# Patient Record
Sex: FEMALE | Race: WHITE | URBAN - METROPOLITAN AREA
[De-identification: names, ages, dates, MRNs, and addresses within clinical notes are randomized per-mention and may not be internally consistent; named-entity substitution may affect disease eponyms.]

---

## 2020-07-28 ENCOUNTER — APPOINTMENT (RX ONLY)
Dept: URBAN - METROPOLITAN AREA CLINIC 374 | Facility: CLINIC | Age: 20
Setting detail: DERMATOLOGY
End: 2020-07-28

## 2020-07-28 DIAGNOSIS — I78.1 NEVUS, NON-NEOPLASTIC: ICD-10-CM

## 2020-07-28 DIAGNOSIS — L30.8 OTHER SPECIFIED DERMATITIS: ICD-10-CM

## 2020-07-28 DIAGNOSIS — D22 MELANOCYTIC NEVI: ICD-10-CM

## 2020-07-28 PROBLEM — D22.5 MELANOCYTIC NEVI OF TRUNK: Status: ACTIVE | Noted: 2020-07-28

## 2020-07-28 PROCEDURE — ? PRESCRIPTION MEDICATION MANAGEMENT

## 2020-07-28 PROCEDURE — ? COUNSELING

## 2020-07-28 PROCEDURE — ? ELECTRODESICCATION

## 2020-07-28 PROCEDURE — 17110 DESTRUCTION B9 LES UP TO 14: CPT

## 2020-07-28 PROCEDURE — 99213 OFFICE O/P EST LOW 20 MIN: CPT | Mod: 25

## 2020-07-28 PROCEDURE — ? PRESCRIPTION

## 2020-07-28 RX ORDER — CLOBETASOL PROPIONATE 0.5 MG/G
OINTMENT TOPICAL
Qty: 1 | Refills: 3 | Status: ERX | COMMUNITY
Start: 2020-07-28

## 2020-07-28 RX ADMIN — CLOBETASOL PROPIONATE: 0.5 OINTMENT TOPICAL at 00:00

## 2020-07-28 ASSESSMENT — LOCATION DETAILED DESCRIPTION DERM
LOCATION DETAILED: SUBXIPHOID
LOCATION DETAILED: RIGHT ANTERIOR DISTAL THIGH
LOCATION DETAILED: LEFT DISTAL PRETIBIAL REGION
LOCATION DETAILED: NASAL SUPRATIP
LOCATION DETAILED: EPIGASTRIC SKIN
LOCATION DETAILED: LEFT ANTERIOR DISTAL THIGH

## 2020-07-28 ASSESSMENT — LOCATION SIMPLE DESCRIPTION DERM
LOCATION SIMPLE: LEFT THIGH
LOCATION SIMPLE: LEFT PRETIBIAL REGION
LOCATION SIMPLE: NOSE
LOCATION SIMPLE: RIGHT THIGH
LOCATION SIMPLE: ABDOMEN

## 2020-07-28 ASSESSMENT — LOCATION ZONE DERM
LOCATION ZONE: LEG
LOCATION ZONE: TRUNK
LOCATION ZONE: NOSE

## 2020-07-28 NOTE — HPI: EVALUATION OF SKIN LESION(S)
What Type Of Note Output Would You Prefer (Optional)?: Standard Output
Hpi Title: Evaluation of a Skin Lesion
How Severe Are Your Spot(S)?: mild
Have Your Spot(S) Been Treated In The Past?: has been treated
Family Member: Grandfather
Hpi Title: Evaluation of Skin Lesions
Have Your Spot(S) Been Treated In The Past?: has not been treated

## 2020-07-28 NOTE — PROCEDURE: PRESCRIPTION MEDICATION MANAGEMENT
Detail Level: Zone
Render In Strict Bullet Format?: No
Initiate Treatment: clobetasol 0.05 % topical ointment; Apply to affected area bid

## 2020-07-28 NOTE — PROCEDURE: ELECTRODESICCATION
Include Z78.9 (Other Specified Conditions Influencing Health Status) As An Associated Diagnosis?: No
Medical Necessity Clause: This procedure was medically necessary because the lesions that were treated were:
Consent: The patient's consent was obtained including but not limited to risks of crusting, scabbing, blistering, scarring, darker or lighter pigmentary change, recurrence, incomplete removal and infection.
Detail Level: Simple
Anesthesia Type: 1% lidocaine with epinephrine
Post-Care Instructions: I reviewed with the patient in detail post-care instructions. Patient is to wear sunprotection, and avoid picking at any of the treated lesions. Pt may apply Vaseline to crusted or scabbing areas
Medical Necessity Information: It is in your best interest to select a reason for this procedure from the list below. All of these items fulfill various CMS LCD requirements except the new and changing color options.

## 2020-09-08 ENCOUNTER — APPOINTMENT (RX ONLY)
Dept: URBAN - METROPOLITAN AREA CLINIC 374 | Facility: CLINIC | Age: 20
Setting detail: DERMATOLOGY
End: 2020-09-08

## 2020-09-08 DIAGNOSIS — L30.8 OTHER SPECIFIED DERMATITIS: ICD-10-CM | Status: IMPROVED

## 2020-09-08 DIAGNOSIS — I78.1 NEVUS, NON-NEOPLASTIC: ICD-10-CM

## 2020-09-08 PROCEDURE — ? PRESCRIPTION

## 2020-09-08 PROCEDURE — ? ELECTRODESICCATION

## 2020-09-08 PROCEDURE — 99213 OFFICE O/P EST LOW 20 MIN: CPT | Mod: 25

## 2020-09-08 PROCEDURE — ? PRESCRIPTION MEDICATION MANAGEMENT

## 2020-09-08 PROCEDURE — 17110 DESTRUCTION B9 LES UP TO 14: CPT

## 2020-09-08 RX ORDER — TACROLIMUS 1 MG/G
OINTMENT TOPICAL QDAY
Qty: 1 | Refills: 2 | Status: ERX | COMMUNITY
Start: 2020-09-08

## 2020-09-08 RX ADMIN — TACROLIMUS: 1 OINTMENT TOPICAL at 00:00

## 2020-09-08 ASSESSMENT — LOCATION SIMPLE DESCRIPTION DERM
LOCATION SIMPLE: LEFT PRETIBIAL REGION
LOCATION SIMPLE: RIGHT THIGH
LOCATION SIMPLE: LEFT THIGH
LOCATION SIMPLE: NOSE

## 2020-09-08 ASSESSMENT — LOCATION DETAILED DESCRIPTION DERM
LOCATION DETAILED: NASAL SUPRATIP
LOCATION DETAILED: LEFT ANTERIOR DISTAL THIGH
LOCATION DETAILED: RIGHT ANTERIOR DISTAL THIGH
LOCATION DETAILED: LEFT DISTAL PRETIBIAL REGION

## 2020-09-08 ASSESSMENT — LOCATION ZONE DERM
LOCATION ZONE: NOSE
LOCATION ZONE: LEG

## 2020-09-08 NOTE — PROCEDURE: PRESCRIPTION MEDICATION MANAGEMENT
Modify Regimen: Alternate clobetasol 0.05 % topical ointment; Apply to affected area bid with tacrolimus
Detail Level: Zone
Render In Strict Bullet Format?: No
Initiate Treatment: Tacrolimus

## 2021-04-29 ENCOUNTER — APPOINTMENT (RX ONLY)
Dept: URBAN - METROPOLITAN AREA CLINIC 374 | Facility: CLINIC | Age: 21
Setting detail: DERMATOLOGY
End: 2021-04-29

## 2021-04-29 DIAGNOSIS — L81.4 OTHER MELANIN HYPERPIGMENTATION: ICD-10-CM

## 2021-04-29 DIAGNOSIS — Z71.89 OTHER SPECIFIED COUNSELING: ICD-10-CM

## 2021-04-29 DIAGNOSIS — D485 NEOPLASM OF UNCERTAIN BEHAVIOR OF SKIN: ICD-10-CM

## 2021-04-29 DIAGNOSIS — D22 MELANOCYTIC NEVI: ICD-10-CM

## 2021-04-29 PROBLEM — D48.5 NEOPLASM OF UNCERTAIN BEHAVIOR OF SKIN: Status: ACTIVE | Noted: 2021-04-29

## 2021-04-29 PROBLEM — D22.5 MELANOCYTIC NEVI OF TRUNK: Status: ACTIVE | Noted: 2021-04-29

## 2021-04-29 PROCEDURE — 99213 OFFICE O/P EST LOW 20 MIN: CPT

## 2021-04-29 PROCEDURE — ? SUNSCREEN RECOMMENDATIONS

## 2021-04-29 PROCEDURE — ? FULL BODY SKIN EXAM

## 2021-04-29 PROCEDURE — ? COUNSELING

## 2021-04-29 PROCEDURE — ? PHOTO-DOCUMENTATION

## 2021-04-29 PROCEDURE — ? DEFER

## 2021-04-29 ASSESSMENT — LOCATION SIMPLE DESCRIPTION DERM
LOCATION SIMPLE: RIGHT FOREARM
LOCATION SIMPLE: UPPER BACK
LOCATION SIMPLE: CHEST
LOCATION SIMPLE: GROIN
LOCATION SIMPLE: LEFT CHEEK
LOCATION SIMPLE: LEFT FOREARM

## 2021-04-29 ASSESSMENT — LOCATION DETAILED DESCRIPTION DERM
LOCATION DETAILED: MIDDLE STERNUM
LOCATION DETAILED: RIGHT PROXIMAL DORSAL FOREARM
LOCATION DETAILED: LEFT SUPRAPUBIC SKIN
LOCATION DETAILED: LEFT CENTRAL MALAR CHEEK
LOCATION DETAILED: LEFT PROXIMAL DORSAL FOREARM
LOCATION DETAILED: INFERIOR THORACIC SPINE

## 2021-04-29 ASSESSMENT — LOCATION ZONE DERM
LOCATION ZONE: FACE
LOCATION ZONE: TRUNK
LOCATION ZONE: ARM

## 2021-04-29 NOTE — PROCEDURE: DEFER
Other Procedure: 1.2cm
Procedure To Be Performed At Next Visit: Biopsy by shave method
Detail Level: Detailed
Introduction Text (Please End With A Colon): The following procedure was deferred

## 2021-05-04 ENCOUNTER — APPOINTMENT (RX ONLY)
Dept: URBAN - METROPOLITAN AREA CLINIC 374 | Facility: CLINIC | Age: 21
Setting detail: DERMATOLOGY
End: 2021-05-04

## 2021-05-04 DIAGNOSIS — D22 MELANOCYTIC NEVI: ICD-10-CM

## 2021-05-04 PROBLEM — D22.5 MELANOCYTIC NEVI OF TRUNK: Status: ACTIVE | Noted: 2021-05-04

## 2021-05-04 PROCEDURE — 11301 SHAVE SKIN LESION 0.6-1.0 CM: CPT

## 2021-05-04 PROCEDURE — ? SHAVE REMOVAL

## 2021-05-04 ASSESSMENT — LOCATION ZONE DERM: LOCATION ZONE: TRUNK

## 2021-05-04 ASSESSMENT — LOCATION DETAILED DESCRIPTION DERM: LOCATION DETAILED: LEFT SUPRAPUBIC SKIN

## 2021-05-04 ASSESSMENT — LOCATION SIMPLE DESCRIPTION DERM: LOCATION SIMPLE: GROIN

## 2023-05-25 ENCOUNTER — OFFICE VISIT (OUTPATIENT)
Dept: INTERNAL MEDICINE | Facility: CLINIC | Age: 23
End: 2023-05-25
Payer: COMMERCIAL

## 2023-05-25 VITALS
HEART RATE: 73 BPM | SYSTOLIC BLOOD PRESSURE: 116 MMHG | OXYGEN SATURATION: 99 % | HEIGHT: 67 IN | TEMPERATURE: 97.1 F | BODY MASS INDEX: 29.66 KG/M2 | DIASTOLIC BLOOD PRESSURE: 68 MMHG | WEIGHT: 189 LBS

## 2023-05-25 DIAGNOSIS — Z80.0 FAMILY HISTORY OF PANCREATIC CANCER: ICD-10-CM

## 2023-05-25 DIAGNOSIS — Z00.00 ENCOUNTER FOR GENERAL ADULT MEDICAL EXAMINATION WITHOUT ABNORMAL FINDINGS: Primary | ICD-10-CM

## 2023-05-25 DIAGNOSIS — Z13.220 LIPID SCREENING: ICD-10-CM

## 2023-05-25 DIAGNOSIS — Z80.0 FAMILY HISTORY OF COLON CANCER: ICD-10-CM

## 2023-05-25 DIAGNOSIS — Z11.59 ENCOUNTER FOR HEPATITIS C SCREENING TEST FOR LOW RISK PATIENT: ICD-10-CM

## 2023-05-25 DIAGNOSIS — Z11.3 ROUTINE SCREENING FOR STI (SEXUALLY TRANSMITTED INFECTION): ICD-10-CM

## 2023-05-25 DIAGNOSIS — F41.9 ANXIETY: ICD-10-CM

## 2023-05-25 PROCEDURE — 3008F BODY MASS INDEX DOCD: CPT | Performed by: STUDENT IN AN ORGANIZED HEALTH CARE EDUCATION/TRAINING PROGRAM

## 2023-05-25 PROCEDURE — 99385 PREV VISIT NEW AGE 18-39: CPT | Performed by: STUDENT IN AN ORGANIZED HEALTH CARE EDUCATION/TRAINING PROGRAM

## 2023-05-25 RX ORDER — VENLAFAXINE HYDROCHLORIDE 150 MG/1
150 CAPSULE, EXTENDED RELEASE ORAL DAILY
Qty: 90 CAPSULE | Refills: 1 | Status: SHIPPED | OUTPATIENT
Start: 2023-05-25 | End: 2023-11-20

## 2023-05-25 RX ORDER — VENLAFAXINE HYDROCHLORIDE 150 MG/1
CAPSULE, EXTENDED RELEASE ORAL DAILY
COMMUNITY
Start: 2023-05-12 | End: 2023-05-25 | Stop reason: SDUPTHER

## 2023-05-25 ASSESSMENT — PATIENT HEALTH QUESTIONNAIRE - PHQ9: SUM OF ALL RESPONSES TO PHQ9 QUESTIONS 1 & 2: 0

## 2023-05-25 NOTE — PROGRESS NOTES
NEW PATIENT    [facility] in [place]  Emili Dickens MD    Phone: [phone]  Fax: [phone]      HISTORY OF PRESENT ILLNESS        Establish Care       Patient is an 23 y.o. female who presents on 5/25/2023 as new patient to establish care.    History of Present Illness    Recent health issues:    COVID in January  Since then had wheezing and intermittent coughing fits  It has been progressively improving, now only occurring every few days  As a young kid had reactive airways, but that resolved by school age    Medical anxiety    Wondering about testing for autism  Doesn't have psychiatrist - effexor has kept     Diet: Better recently, a lot of sweets which she is trying to cut back on  Activity: trying to go for walks twice a week, wants to increase frequency  Dental: UTD  Vision: due  Cancer screenings  Pap at 19 or 20, normal    Other Providers caring for patient:   Patient Care Team     Relationship Specialty Notifications Start End   Emili Dickens MD PCP - General Family Medicine Admissions 5/25/23     Address:  [address] [place] [address]          Health Maintenance   Topic Date Due   • COVID-19 Vaccine (1) Never done   • HPV Vaccines (1 - 2-dose series) Never done   • Depression Screening  Never done   • HIV Screening  Never done   • Hepatitis C Screening  Never done   • Cervical Cancer Screening  Never done   • DTaP, Tdap, and Td Vaccines (7 - Td or Tdap) 09/03/2020   • Influenza Vaccine (Season Ended) 08/01/2023   • Zoster Vaccine (1 of 2) 01/29/2048   • Meningococcal ACWY  Completed   • HIB Vaccines  Completed   • Hepatitis B Vaccines  Completed   • IPV Vaccines  Completed   • Pneumococcal  Aged Out       Below was reviewed by me on the day of the visit.  PAST MEDICAL AND SURGICAL HISTORY        Past Medical History:   Diagnosis Date   • Anxiety    • Depression    • OCD (obsessive compulsive disorder)        Past Surgical History:  "  Procedure Laterality Date   • WISDOM TOOTH EXTRACTION       MEDICATIONS          Current Outpatient Medications:   •  venlafaxine XR (EFFEXOR-XR) 150 mg 24 hr capsule, Take 1 capsule (150 mg total) by mouth daily., Disp: 90 capsule, Rfl: 1  ALLERGIES        Patient has no known allergies.  FAMILY HISTORY        Family History   Problem Relation Age of Onset   • Hashimoto's thyroiditis Biological Mother    • Colon cancer Biological Mother 58   • Diabetes type II Biological Mother    • Diabetes type II Biological Father    • Nephrolithiasis Biological Father    • Gout Biological Father    • Aneurysm Maternal Grandmother    • Pancreatic cancer Maternal Grandfather    • Stroke Paternal Grandmother      SOCIAL/ TOBACCO HISTORY        Social History     Tobacco Use   • Smoking status: Never   • Smokeless tobacco: Never   Vaping Use   • Vaping status: Never Used   Substance Use Topics   • Alcohol use: Yes     Comment: social   • Drug use: Not Currently     REVIEW OF SYSTEMS        Review of Systems   See HPI  PHYSICAL EXAMINATION      Visit Vitals  /68 (BP Location: Right upper arm, Patient Position: Sitting)   Pulse 73   Temp 36.2 °C (97.1 °F)   Ht 1.702 m (5' 7\")   Wt 85.7 kg (189 lb)   SpO2 99%   BMI 29.60 kg/m²      Body mass index is 29.6 kg/m².  Wt Readings from Last 3 Encounters:   05/25/23 85.7 kg (189 lb)        No results found.      Physical Exam  Vitals reviewed.   Constitutional:       General: She is not in acute distress.     Appearance: Normal appearance. She is not ill-appearing, toxic-appearing or diaphoretic.   HENT:      Head: Normocephalic and atraumatic.      Right Ear: Tympanic membrane, ear canal and external ear normal. There is no impacted cerumen.      Left Ear: Tympanic membrane, ear canal and external ear normal. There is no impacted cerumen.      Nose: Nose normal.      Mouth/Throat:      Mouth: Mucous membranes are moist.      Pharynx: Oropharynx is clear.   Eyes:      General: No " scleral icterus.     Extraocular Movements: Extraocular movements intact.      Conjunctiva/sclera: Conjunctivae normal.      Pupils: Pupils are equal, round, and reactive to light.   Cardiovascular:      Rate and Rhythm: Normal rate and regular rhythm.      Pulses: Normal pulses.      Heart sounds: Normal heart sounds. No murmur heard.     No friction rub. No gallop.   Pulmonary:      Effort: Pulmonary effort is normal.      Breath sounds: Normal breath sounds. No wheezing or rales.   Abdominal:      General: Bowel sounds are normal.      Palpations: Abdomen is soft.      Tenderness: There is no guarding or rebound.   Musculoskeletal:         General: No deformity or signs of injury.      Cervical back: Normal range of motion and neck supple.   Skin:     General: Skin is warm and dry.      Capillary Refill: Capillary refill takes less than 2 seconds.   Neurological:      General: No focal deficit present.      Mental Status: She is alert and oriented to person, place, and time.   Psychiatric:         Mood and Affect: Mood normal.         Behavior: Behavior normal.         PRIOR LABS        No results found for: HGBA1C  No results found for: CHOL  No results found for: HDL  No results found for: LDLCALC  No results found for: TRIG  No results found for: CHOLHDL  No results found for: TSH  No results found for: WBC, HGB, HCT, MCV, PLT  No results found for: NA, K, CL, CO2, BUN, CREATININE, GLUCOSE, AST, ALT, PROTEIN, ALBUMIN, BILITOT, EGFR, ANIONGAP    ASSESSMENT AND PLAN   Diagnoses and all orders for this visit:    Encounter for general adult medical examination without abnormal findings (Primary)  Exam within normal limits  Recommend:   - high fiber diet that is rich in fruits, vegetables, whole grains, legumes, nuts and seeds, and low in processed foods, refined grains, added sugars, and trans-fats  - regular exercise with goal of >150 min moderate intensity exercise per week  - routine dental cleanings q6  mo  Cancer screenings:  Immunizations:    -     Hemoglobin A1c; Future  -     CBC and differential; Future  -     Basic metabolic panel; Future  -     TSH w reflex FT4; Future    Anxiety  Assessment & Plan:  Stable on effexor for years  Continue current dose    Concerned about possibility of autism - can pursue formal eval through psychology vs psychiatry.    Orders:  -     venlafaxine XR (EFFEXOR-XR) 150 mg 24 hr capsule; Take 1 capsule (150 mg total) by mouth daily.    Family history of colon cancer  Family history of pancreatic cancer  Strong family hx of cancer, pt reports more remote fam hx of leong sydrome, interested in genetic testing  Refer to cancer genetic counseling dept  -     Ambulatory Referral to Adult Genetics - [facility]; Future    Lipid screening  -     Lipid panel; Future    Routine screening for STI (sexually transmitted infection)  -     HIV 1,2 AB P24 AG; Future  -     RPR; Future  -     Chlamydia/Neisseria Gonorrhoeae RNA; Future    Encounter for hepatitis C screening test for low risk patient  -     Hepatitis C antibody; Future      Return in about 1 year (around 5/25/2024) for annual physical.    Emili Dickens MD    5/25/2023

## 2023-05-25 NOTE — PATIENT INSTRUCTIONS
Resources in the area where you can have formal autism evaluation:    [facility]: [phone]    [facility]: [phone]    Consider reaching out to [facility] to see if they do evaluations.

## 2023-06-02 LAB
BASOPHILS # BLD AUTO: 0.1 X10E3/UL (ref 0–0.2)
BASOPHILS NFR BLD AUTO: 1 %
EOSINOPHIL # BLD AUTO: 0.2 X10E3/UL (ref 0–0.4)
EOSINOPHIL NFR BLD AUTO: 3 %
ERYTHROCYTE [DISTWIDTH] IN BLOOD BY AUTOMATED COUNT: 14.7 % (ref 11.7–15.4)
HCT VFR BLD AUTO: 31.5 % (ref 34–46.6)
HGB BLD-MCNC: 9.6 G/DL (ref 11.1–15.9)
IMM GRANULOCYTES # BLD AUTO: 0 X10E3/UL (ref 0–0.1)
IMM GRANULOCYTES NFR BLD AUTO: 0 %
LYMPHOCYTES # BLD AUTO: 2 X10E3/UL (ref 0.7–3.1)
LYMPHOCYTES NFR BLD AUTO: 37 %
MCH RBC QN AUTO: 23.4 PG (ref 26.6–33)
MCHC RBC AUTO-ENTMCNC: 30.5 G/DL (ref 31.5–35.7)
MCV RBC AUTO: 77 FL (ref 79–97)
MONOCYTES # BLD AUTO: 0.4 X10E3/UL (ref 0.1–0.9)
MONOCYTES NFR BLD AUTO: 7 %
NEUTROPHILS # BLD AUTO: 3 X10E3/UL (ref 1.4–7)
NEUTROPHILS NFR BLD AUTO: 52 %
PLATELET # BLD AUTO: 382 X10E3/UL (ref 150–450)
RBC # BLD AUTO: 4.11 X10E6/UL (ref 3.77–5.28)
WBC # BLD AUTO: 5.6 X10E3/UL (ref 3.4–10.8)

## 2023-06-03 LAB
BUN SERPL-MCNC: 11 MG/DL (ref 6–20)
BUN/CREAT SERPL: 17 (ref 9–23)
CALCIUM SERPL-MCNC: 9.4 MG/DL (ref 8.7–10.2)
CHLORIDE SERPL-SCNC: 102 MMOL/L (ref 96–106)
CHOLEST SERPL-MCNC: 193 MG/DL (ref 100–199)
CO2 SERPL-SCNC: 25 MMOL/L (ref 20–29)
CREAT SERPL-MCNC: 0.66 MG/DL (ref 0.57–1)
EGFRCR SERPLBLD CKD-EPI 2021: 125 ML/MIN/1.73
GLUCOSE SERPL-MCNC: 80 MG/DL (ref 70–99)
HBA1C MFR BLD: 5.3 % (ref 4.8–5.6)
HCV IGG SERPL QL IA: NON REACTIVE
HDLC SERPL-MCNC: 45 MG/DL
HIV 1+2 AB+HIV1 P24 AG SERPL QL IA: NON REACTIVE
LDLC SERPL CALC-MCNC: 131 MG/DL (ref 0–99)
POTASSIUM SERPL-SCNC: 4.4 MMOL/L (ref 3.5–5.2)
RPR SER QL: NON REACTIVE
SODIUM SERPL-SCNC: 139 MMOL/L (ref 134–144)
T4 FREE SERPL-MCNC: 0.85 NG/DL (ref 0.82–1.77)
TRIGL SERPL-MCNC: 91 MG/DL (ref 0–149)
TSH SERPL DL<=0.005 MIU/L-ACNC: 1.56 UIU/ML (ref 0.45–4.5)
VLDLC SERPL CALC-MCNC: 17 MG/DL (ref 5–40)

## 2023-06-04 LAB
C TRACH RRNA SPEC QL NAA+PROBE: NEGATIVE
N GONORRHOEA RRNA SPEC QL NAA+PROBE: NEGATIVE

## 2023-06-12 DIAGNOSIS — D50.9 IRON DEFICIENCY ANEMIA, UNSPECIFIED IRON DEFICIENCY ANEMIA TYPE: Primary | ICD-10-CM

## 2023-06-14 ENCOUNTER — TELEPHONE (OUTPATIENT)
Dept: GENETICS | Facility: HOSPITAL | Age: 23
End: 2023-06-14
Payer: COMMERCIAL

## 2023-06-14 NOTE — TELEPHONE ENCOUNTER
I called Yessi Sharpe per referral from Dr. Dickens for a cancer genetic counseling appointment. I provided Yessi Sharpe with a brief overview of what to expect at an appointment and scheduling options. Yessi Sharpe was agreeable to scheduling an appointment via telemedicine. I transferred her to general scheduling.

## 2023-08-30 ENCOUNTER — TELEMEDICINE (OUTPATIENT)
Dept: GENETICS | Age: 23
End: 2023-08-30
Attending: STUDENT IN AN ORGANIZED HEALTH CARE EDUCATION/TRAINING PROGRAM
Payer: COMMERCIAL

## 2023-08-30 DIAGNOSIS — Z71.83 ENCOUNTER FOR NONPROCREATIVE GENETIC COUNSELING: Primary | ICD-10-CM

## 2023-08-30 DIAGNOSIS — Z80.0 FAMILY HISTORY OF COLON CANCER: ICD-10-CM

## 2023-08-30 DIAGNOSIS — Z80.8 FHX: MELANOMA: ICD-10-CM

## 2023-08-30 DIAGNOSIS — Z80.0 FAMILY HISTORY OF PANCREATIC CANCER: ICD-10-CM

## 2023-08-30 PROCEDURE — 96040 HC GENETICS COUNSELING SESSIONS-TELEHEALTH: CPT | Performed by: GENETIC COUNSELOR, MS

## 2023-08-30 NOTE — PROGRESS NOTES
Patient Name: Yessi Sharpe  Patient Legal Name: Yessi Sharpe  : 2000       Telemedicine Consent:    Prior to commencing the session, Yessi Sharpe provided verbal consent to have genetic counseling via telemedicine using [employer] Video Visit (Epic Video Client), which is a telemedicine platform being utilized.  Yessi understands the session will be billed to insurance or to them directly if uninsured or if not covered by insurance. Yessi was informed that the clinician is the only provider on?the video conference, sessions are not recorded by the clinician, and the patient is not permitted to record the session. The clinician confirmed identification of patient by name and birthdate, confirmed patient location, support person(s) present, and obtained a callback number in case disconnected.     Indication for Appointment:  Yessi Sharpe presented for genetic counseling and cancer risk assessment via a telemedicine encounter due to a family history of colon, melanoma, and pancreatic cancer. Yessi was referred by Emili Dickens MD and presented to the session alone.    Personal History:   Yessi is a 23 y.o. female of [place], [place]ian, and Ashkenazi [place] descent with primary visit diagnosis of Encounter for nonprocreative genetic counseling [Z71.83].    Past Medical History:   Diagnosis Date   • Anxiety    • Depression    • OCD (obsessive compulsive disorder)       Past Medical History Pertinent Negatives:   Denies History Of: Date Noted   • Disease of thyroid gland 2023   • Fibrocystic breast 2023   • Fibroid 2023   • Screening for breast cancer 2023     Past Surgical History:   Procedure Laterality Date   • [place] tooth extraction       Past Surgical History Pertinent Negatives:   Denies History Of: Date Noted   • Breast biopsy 2023   • Colonoscopy 2023   • Hysterectomy 2023   • Oophorectomy 2023      Height/Weight: (previously recorded in physician's  "office)  Height: 1.702 m (5' 7\")  Weight: 85.7 kg (189 lb)    Gynecologic History:  Menarche Age: 7 years  Age at first live birth: Nulliparous  Menopause Status: Pre-Menopause  Use of hormonal contraceptives: No  Use of fertility medications: No  Use of hormone replacement therapy: No  Use of Tamoxifen/Evista: No    Social History     Tobacco Use   • Smoking status: Never   • Smokeless tobacco: Never   Vaping Use   • Vaping Use: Never used   Substance Use Topics   • Alcohol use: Yes     Comment: social   • Drug use: Not Currently       Family History:  See completed family history in pedigree below. Of note, Yessi reports that her maternal grandmother's half-sisters may have tested positive for a mutation in one of the Amin Syndrome genes. She also reports that her mother's genetic testing was negative for Amin. The test reports are unavailable for review.    Genetic Education/Risk Assessment/Counseling:  Information was provided about the relationship between genes and cancer.  The concept of hereditary cancer was defined.  Natural history, risks and inheritance patterns of  cancer-associated genes were reviewed, as related to Yessi’s personal and/or family history.  Related psychosocial aspects were discussed.    Discussion of Genetic Testing:  The pros, cons, and limitations of testing for genetic susceptibility were discussed, including but not limited to test options, possible results, potential impact on management, and psychosocial aspects.  There may be limited data on the degree of cancer risk and/or no defined management guidelines associated with some genes.  If applicable, risk assessment models and/or published tables were used to provide a mutation probability estimate. Limitations of assessment were reviewed.    Given the reported personal and/or family history, genetic testing was offered and accepted. The following testing was ordered:    [facility]   Comprehensive Common Cancer Panel    Plan:  Yessi " was confirmed to have understood the aforementioned information and was assisted with decision making as needed.  Informational and supportive resources were provided. Consent was obtained to share chart note(s) with physicians. Yessi is encouraged to contact the program with personal/family history updates. Yessi will be contacted via telephone when genetic test results are available.     A total of 30 minutes was spent providing genetic counseling to Yessi.

## 2023-08-30 NOTE — LETTER
08/30/23    Emili Dickens MD  [address] [place] [address]    Re:  Patient Preferred Name: Yessi Sharpe  Patient Legal Name: Yessi Mcgovernn    Dear Dr. Dickens,    Thank you for referring your patient, Yessi Sharpe, to receive care through my office. I have enclosed a summary of the care provided to Yessi on 08/30/23.    Please contact me with any questions you may have regarding the visit.    Sincerely,             Farrah Adam, [facility]    [address] [place] [address]    CC: No Recipients

## 2023-08-30 NOTE — LETTER
08/30/23    Yessi Jaureguiwan  [address] [place] [address]    Dear MsLoren Dell,    Thank you for participating in the [facility].  It was a pleasure working with you. Attached is documentation from our discussion(s). It will also be sent to any physicians you indicated.      You may also choose to share this documentation with your family members. Because cancer risk is based on both personal and both maternal and paternal family history factors, as well as mutation status, your relatives are recommended to review their risks and genetic testing options (if applicable) with their own healthcare providers to derive a risk-appropriate, individualized plan.      If you have any questions, concerns, or updates to your personal/family history, please contact the [facility] Program at [phone] to further review your case.    Please see below for your encounter report.    Sincerely,         Farrah Adam, [facility]        Encounter Note    Telemedicine Consent:    Prior to commencing the session, Yessi NORRIS Dell provided verbal consent to have genetic counseling via telemedicine using [employer] Visit (Epic Video Client), which is a telemedicine platform being utilized.  Yessi understands the session will be billed to insurance or to them directly if uninsured or if not covered by insurance. Yessi was informed that the clinician is the only provider on?the video conference, sessions are not recorded by the clinician, and the patient is not permitted to record the session. The clinician confirmed identification of patient by name and birthdate, confirmed patient location, support person(s) present, and obtained a callback number in case disconnected.     Indication for Appointment:  Yessi Mcgovernn presented for genetic counseling and cancer risk assessment via a telemedicine encounter due to a family history of colon, melanoma, and  "pancreatic cancer. Yessi was referred by Emili Dickens MD and presented to the session alone.    Personal History:   Yessi is a 23 y.o. female of [place], [place], and Ashkenazi Congregational descent with primary visit diagnosis of Encounter for nonprocreative genetic counseling [Z71.83].    Past Medical History:   Diagnosis Date   • Anxiety    • Depression    • OCD (obsessive compulsive disorder)       Past Medical History Pertinent Negatives:   Denies History Of: Date Noted   • Disease of thyroid gland 08/30/2023   • Fibrocystic breast 08/30/2023   • Fibroid 08/30/2023   • Screening for breast cancer 08/30/2023     Past Surgical History:   Procedure Laterality Date   • [place] tooth extraction       Past Surgical History Pertinent Negatives:   Denies History Of: Date Noted   • Breast biopsy 08/30/2023   • Colonoscopy 08/30/2023   • Hysterectomy 08/30/2023   • Oophorectomy 08/30/2023      Height/Weight: (previously recorded in physician's office)  Height: 1.702 m (5' 7\")  Weight: 85.7 kg (189 lb)    Gynecologic History:  Menarche Age: 7 years  Age at first live birth: Nulliparous  Menopause Status: Pre-Menopause  Use of hormonal contraceptives: No  Use of fertility medications: No  Use of hormone replacement therapy: No  Use of Tamoxifen/Evista: No    Social History     Tobacco Use   • Smoking status: Never   • Smokeless tobacco: Never   Vaping Use   • Vaping Use: Never used   Substance Use Topics   • Alcohol use: Yes     Comment: social   • Drug use: Not Currently       Family History:  See completed family history in pedigree below. Of note, Yessi reports that her maternal grandmother's half-sisters may have tested positive for a mutation in one of the Amin Syndrome genes. She also reports that her mother's genetic testing was negative for Amin. The test reports are unavailable for review.    Genetic Education/Risk Assessment/Counseling:  Information was provided about the relationship between genes and cancer.  The " concept of hereditary cancer was defined.  Natural history, risks and inheritance patterns of  cancer-associated genes were reviewed, as related to Yessi’s personal and/or family history.  Related psychosocial aspects were discussed.    Discussion of Genetic Testing:  The pros, cons, and limitations of testing for genetic susceptibility were discussed, including but not limited to test options, possible results, potential impact on management, and psychosocial aspects.  There may be limited data on the degree of cancer risk and/or no defined management guidelines associated with some genes.  If applicable, risk assessment models and/or published tables were used to provide a mutation probability estimate. Limitations of assessment were reviewed.    Given the reported personal and/or family history, genetic testing was offered and accepted. The following testing was ordered:    [facility]   Comprehensive Common Cancer Panel    Plan:  Yessi was confirmed to have understood the aforementioned information and was assisted with decision making as needed.  Informational and supportive resources were provided. Consent was obtained to share chart note(s) with physicians. Yessi is encouraged to contact the program with personal/family history updates. Yessi will be contacted via telephone when genetic test results are available.     A total of 30 minutes was spent providing genetic counseling to Yessi.

## 2023-08-30 NOTE — LETTER
08/30/23    Yessi NORRIS Dell  [address] [place] [address]    Dear MsLoren Dell,    Thank you for participating in the [facility].  It was a pleasure working with you. Attached is documentation from our discussion(s). It will also be sent to any physicians you indicated.      You may also choose to share this documentation with your family members. Because cancer risk is based on both personal and both maternal and paternal family history factors, as well as mutation status, your relatives are recommended to review their risks and genetic testing options (if applicable) with their own healthcare providers to derive a risk-appropriate, individualized plan.      If you have any questions, concerns, or updates to your personal/family history, please contact the [facility] Risk Assessment and Genetics Program at [phone] to further review your case.    Please see below for your encounter report.    Sincerely,         Farrah Adam, [facility]        Encounter Note    Telemedicine Consent:    Prior to commencing the session, Yessi MYRNA Sharpe provided verbal consent to have genetic counseling via telemedicine using [employer] Visit (Epic Video Client), which is a telemedicine platform being utilized.  Yessi understands the session will be billed to insurance or to them directly if uninsured or if not covered by insurance. Yessi was informed that the clinician is the only provider on?the video conference, sessions are not recorded by the clinician, and the patient is not permitted to record the session. The clinician confirmed identification of patient by name and birthdate, confirmed patient location, support person(s) present, and obtained a callback number in case disconnected.     Indication for Appointment:  Yessi NORRIS Dell presented for genetic counseling and cancer risk assessment via a telemedicine encounter due to a {Referral Reason:[id number]}. Yessi was  "{Referred by:[id number]} and presented to the session alone.    Personal History:   Yessi is a 23 y.o. female of *** descent with primary visit diagnosis of Encounter for nonprocreative genetic counseling [Z71.83].    Past Medical History:   Diagnosis Date   • Anxiety    • Depression    • OCD (obsessive compulsive disorder)       Past Medical History Pertinent Negatives:   Denies History Of: Date Noted   • Disease of thyroid gland 08/30/2023   • Fibrocystic breast 08/30/2023   • Fibroid 08/30/2023   • Screening for breast cancer 08/30/2023     Past Surgical History:   Procedure Laterality Date   • [place] tooth extraction       Past Surgical History Pertinent Negatives:   Denies History Of: Date Noted   • Breast biopsy 08/30/2023   • Colonoscopy 08/30/2023   • Hysterectomy 08/30/2023   • Oophorectomy 08/30/2023       Head Circumference: *** centimeters     Height/Weight:  Height: 1.702 m (5' 7\")  Weight: 85.7 kg (189 lb)    ***delete gyn hx if male  Gynecologic History:  Menarche Age: 7 years  Age at first live birth: Nulliparous  Menopause Status: Pre-Menopause  Use of hormonal contraceptives: No  Use of fertility medications: No  Use of hormone replacement therapy: No  Use of Tamoxifen/Evista: No    Social History     Tobacco Use   • Smoking status: Never   • Smokeless tobacco: Never   Vaping Use   • Vaping Use: Never used   Substance Use Topics   • Alcohol use: Yes     Comment: social   • Drug use: Not Currently       Family History:  See completed family history in pedigree below.  *** insert  Genetic Education/Risk Assessment/Counseling:  Information was provided about the relationship between genes and cancer.  The concept of hereditary cancer was defined.  Natural history, risks and inheritance patterns of  cancer-associated genes were reviewed, as related to Yessi’s personal and/or family history.  Related psychosocial aspects were discussed.    Discussion of Genetic Testing:  The pros, cons, and limitations " of testing for genetic susceptibility were discussed, including but not limited to test options, possible results, potential impact on management, and psychosocial aspects.  There may be limited data on the degree of cancer risk and/or no defined management guidelines associated with some genes.  If applicable, risk assessment models and/or published tables were used to provide a mutation probability estimate. Limitations of assessment were reviewed.    Given the reported personal and/or family history, genetic testing {Testing Status:[id number]}

## 2023-09-22 ENCOUNTER — TELEPHONE (OUTPATIENT)
Dept: GENETICS | Age: 23
End: 2023-09-22
Payer: COMMERCIAL

## 2023-09-22 NOTE — LETTER
09/22/23    Yessi Sharpe  [address] [place] [address]    Dear Ms. Sharpe,    Thank you for participating in the [facility].  It was a pleasure working with you. Attached is documentation from our discussion(s). It will also be sent to any physicians you indicated.      You may also choose to share this documentation with your family members. Because cancer risk is based on both personal and both maternal and paternal family history factors, as well as mutation status, your relatives are recommended to review their risks and genetic testing options (if applicable) with their own healthcare providers to derive a risk-appropriate, individualized plan.      If you have any questions, concerns, or updates to your personal/family history, please contact the [facility] Program at [phone] to further review your case.    Please see below for your encounter report.    Sincerely,         Farrah Adam, [facility]        Encounter Note       Indication for Appointment:  Yessi Sharpe was seen by the Cancer Risk Assessment and Genetics Program via a telemedicine encounter due to a family history of colon, melanoma, and pancreatic cancer. Genetic testing was performed.    Yessi was contacted by telephone today to discuss genetic test results, risk-based management guidelines and any potential additional test options. Follow up appointments to discuss the results in more detail with our medical director may be scheduled by contacting the Cancer Risk Assessment and Genetics Program.    Genetic Test Results:    RESULT:    Negative- No Pathogenic Variants Identified    LAB/TEST:  [facility]   Comprehensive Common Cancer Panel        After receiving consent, the following result(s) were disclosed to Yessi:   - No reportable alterations were identified by sequencing and/or deletion/duplication analysis as interpreted by this laboratory.  This is  referred to as an indeterminate negative result.  A mutation could be present that cannot be detected by the current tests performed or in a gene not tested. Additionally, biological family members may have a mutation in any of the genes tested Yessi does not given the negative result.    Personal History:   Yessi is a 23 y.o. female of Indonesian, Swazi, and Ashkenazi Zoroastrian descent.    Past Medical History:   Diagnosis Date    Anxiety     Depression     OCD (obsessive compulsive disorder)      Past Medical History Pertinent Negatives:   Denies History Of: Date Noted    Disease of thyroid gland 08/30/2023    Fibrocystic breast 08/30/2023    Fibroid 08/30/2023    Screening for breast cancer 08/30/2023     Past Surgical History:   Procedure Laterality Date    [place] tooth extraction       Past Surgical History Pertinent Negatives:   Denies History Of: Date Noted    Breast biopsy 08/30/2023    Colonoscopy 08/30/2023    Hysterectomy 08/30/2023    Oophorectomy 08/30/2023     Gynecologic History:  Menarche Age: 7 years  Age at first live birth: Nulliparous  Menopause Status: Pre-Menopause  Use of hormonal contraceptives: No  Use of fertility medications: No  Use of hormone replacement therapy: No  Use of Tamoxifen/Evista: No    Social History     Tobacco Use    Smoking status: Never    Smokeless tobacco: Never   Vaping Use    Vaping Use: Never used   Substance Use Topics    Alcohol use: Yes     Comment: social    Drug use: Not Currently       Family History:  See completed family history in pedigree below.        Risk Assessment and Management:    As a clinically actionable mutation was not identified by the current test method(s), the cancer risks and guidelines reviewed are based on the personal and/or family history provided. As guidelines continually change and the efficacy of screening for some cancers remains under investigation, Yessi Sharpe is encouraged to review personal and family history with  managing physician(s) regularly.    Site of Surveillance Coordinating Provider Recommendation   Breast Primary Care or Gynecology · Lifetime risk of developing breast cancer was calculated using the Tyrer-Cuzick model and is estimated to be 15.1%.  · Breast cancer risk is dynamic and can increase with age and other factors.   · Breast cancer risk assessment should be reviewed at medical visits.  · A high lifetime risk for developing breast cancer is typically 20-25% or higher.  National Comprehensive Cancer Network (NCCN) guidelines for breast cancer screening include:   Breast awareness with prompt reporting of any noticed changes to healthcare provider    Annual clinical visit including clinical breast exam and ongoing risk assessment starting at age 25  Annual mammogram beginning by age 40     Gynecologic Gynecology · Pelvic exam and pap test annually or as directed by physician.      Gastrointestinal PCP  Gastroenterology · Guidelines for when to begin colorectal cancer screening in average risk individuals vary between age 45 and 50; thus, an appropriate screening plan based on personal factors, such as age, race, and symptoms, as well as family history, should be determined by screening gastroenterologist.   · Based on the reported family history of colorectal cancer, Mara lifetime risk of developing colorectal cancer is increased 2-3 fold over that of the general population.  General population risk is approximately 5-6%; thus, Yessis risk is 10-18%. This may be an underestimation of risk as the average colorectal cancer risk among individuals of Ashkenazi Faith descent has been reported to be increased. The patient was encouraged to review this history and risk with managing gastroenterologist to determine baseline age and frequency of colonoscopy.     Skin PCP  Dermatology · Yessi is encouraged to practice skin protective behaviors, such as:  · Annual full-body skin examination   · Use of sun  protective barriers such as sunscreens, clothing, hats and UV protective sunglasses with avoidance of mid-day sun, chronic sun exposure, and tanning beds is strongly recommended   · Awareness of ABCDEs of melanoma (asymmetry, border, color, diameter, evolution).       General Management PCP · Annual physical examination is encouraged.  · Adherence to a healthy lifestyle, including body mass index (BMI) <25 obtained through balanced diet and exercise  · Limit intake of alcoholic beverages to less than 1 drink per day (serving equals 1 ounce of liquor, 6 ounces of wine or 8 ounces of beer)  · Not smoking.       Plan:  Cancer risks are based on personal history, as well as on maternal and paternal family histories, mutation status, and other factors.  Relatives are encouraged to consider risk assessment and/or genetic evaluation to derive a risk-appropriate, individualized plan.  Should family member(s) be interested in genetic evaluation, the Cancer Risk Assessment and Genetics Program can provide consultation or help to find a genetic counselor in their area.    The information provided reflects current practice guidelines and may change with new medical discoveries/technology/updated personal or family history information.  Yessi was confirmed to have understood the aforementioned information and was assisted with decision making as needed.  Informational and supportive resources were provided.  Potential psychosocial ramifications related to test results were reviewed.  Consent was obtained to share chart note(s) with physicians.  Yessi plans to discuss the above information with physicians to determine an optimal risk management plan.    Yessi should contact the program with personal/family history updates as this could alter the guidelines provided and/or available test options and/or to inquire about new information specific to this case.   As stated, there may be other genes associated with cancer risk for  which Yessi was not tested.  Yessi was encouraged to contact the genetics program at [phone] with any questions or concerns and/or to periodically review test options and related insurance coverage.

## 2023-09-22 NOTE — TELEPHONE ENCOUNTER
Patient Name: Yessi Sharpe  Patient Legal Name: Yessi Sharpe  : 2000       Indication for Appointment:  Yessi Sharpe was seen by the Cancer Risk Assessment and Genetics Program via a telemedicine encounter due to a family history of colon, melanoma, and pancreatic cancer. Genetic testing was performed.    Yessi was contacted by telephone today to discuss genetic test results, risk-based management guidelines and any potential additional test options. Follow up appointments to discuss the results in more detail with our medical director may be scheduled by contacting the Cancer Risk Assessment and Genetics Program.    Genetic Test Results:    RESULT:    Negative- No Pathogenic Variants Identified    LAB/TEST:  [facility] ([facility])   Comprehensive Common Cancer Panel        After receiving consent, the following result(s) were disclosed to Yessi:   - No reportable alterations were identified by sequencing and/or deletion/duplication analysis as interpreted by this laboratory.  This is referred to as an indeterminate negative result.  A mutation could be present that cannot be detected by the current tests performed or in a gene not tested. Additionally, biological family members may have a mutation in any of the genes tested Yessi does not given the negative result.    Personal History:   Yessi is a 23 y.o. female of English, Azerbaijani, and Ashkenazi Catholic descent.    Past Medical History:   Diagnosis Date    Anxiety     Depression     OCD (obsessive compulsive disorder)      Past Medical History Pertinent Negatives:   Denies History Of: Date Noted    Disease of thyroid gland 2023    Fibrocystic breast 2023    Fibroid 2023    Screening for breast cancer 2023     Past Surgical History:   Procedure Laterality Date    [place] tooth extraction       Past Surgical History Pertinent Negatives:   Denies History Of: Date Noted    Breast biopsy 2023    Colonoscopy 2023    Hysterectomy  08/30/2023    Oophorectomy 08/30/2023     Gynecologic History:  Menarche Age: 7 years  Age at first live birth: Nulliparous  Menopause Status: Pre-Menopause  Use of hormonal contraceptives: No  Use of fertility medications: No  Use of hormone replacement therapy: No  Use of Tamoxifen/Evista: No    Social History     Tobacco Use    Smoking status: Never    Smokeless tobacco: Never   Vaping Use    Vaping Use: Never used   Substance Use Topics    Alcohol use: Yes     Comment: social    Drug use: Not Currently       Family History:  See completed family history in pedigree below.        Risk Assessment and Management:     As a clinically actionable mutation was not identified by the current test method(s), the cancer risks and guidelines reviewed are based on the personal and/or family history provided. As guidelines continually change and the efficacy of screening for some cancers remains under investigation, Yessi Sharpe is encouraged to review personal and family history with managing physician(s) regularly.    Site of Surveillance Coordinating Provider Recommendation   Breast Primary Care or Gynecology · Lifetime risk of developing breast cancer was calculated using the Tyrer-Cuzick model and is estimated to be 15.1%.  · Breast cancer risk is dynamic and can increase with age and other factors.   · Breast cancer risk assessment should be reviewed at medical visits.  · A high lifetime risk for developing breast cancer is typically 20-25% or higher.  National Comprehensive Cancer Network (NCCN) guidelines for breast cancer screening include:   Breast awareness with prompt reporting of any noticed changes to healthcare provider    Annual clinical visit including clinical breast exam and ongoing risk assessment starting at age 25  Annual mammogram beginning by age 40     Gynecologic Gynecology · Pelvic exam and pap test annually or as directed by physician.      Gastrointestinal PCP  Gastroenterology · Guidelines  for when to begin colorectal cancer screening in average risk individuals vary between age 45 and 50; thus, an appropriate screening plan based on personal factors, such as age, race, and symptoms, as well as family history, should be determined by screening gastroenterologist.   · Based on the reported family history of colorectal cancer, Mara lifetime risk of developing colorectal cancer is increased 2-3 fold over that of the general population.  General population risk is approximately 5-6%; thus, Mara risk is 10-18%. This may be an underestimation of risk as the average colorectal cancer risk among individuals of Ashkenazi Gnosticism descent has been reported to be increased. The patient was encouraged to review this history and risk with managing gastroenterologist to determine baseline age and frequency of colonoscopy.     Skin PCP  Dermatology · Yessi is encouraged to practice skin protective behaviors, such as:  · Annual full-body skin examination   · Use of sun protective barriers such as sunscreens, clothing, hats and UV protective sunglasses with avoidance of mid-day sun, chronic sun exposure, and tanning beds is strongly recommended   · Awareness of ABCDEs of melanoma (asymmetry, border, color, diameter, evolution).        General Management PCP · Annual physical examination is encouraged.  · Adherence to a healthy lifestyle, including body mass index (BMI) <25 obtained through balanced diet and exercise  · Limit intake of alcoholic beverages to less than 1 drink per day (serving equals 1 ounce of liquor, 6 ounces of wine or 8 ounces of beer)  · Not smoking.       Plan:  Cancer risks are based on personal history, as well as on maternal and paternal family histories, mutation status, and other factors.  Relatives are encouraged to consider risk assessment and/or genetic evaluation to derive a risk-appropriate, individualized plan.  Should family member(s) be interested in genetic evaluation, the Cancer  Risk Assessment and Genetics Program can provide consultation or help to find a genetic counselor in their area.    The information provided reflects current practice guidelines and may change with new medical discoveries/technology/updated personal or family history information.  Yessi was confirmed to have understood the aforementioned information and was assisted with decision making as needed.  Informational and supportive resources were provided.  Potential psychosocial ramifications related to test results were reviewed.  Consent was obtained to share chart note(s) with physicians.  Yessi plans to discuss the above information with physicians to determine an optimal risk management plan.    Yessi should contact the program with personal/family history updates as this could alter the guidelines provided and/or available test options and/or to inquire about new information specific to this case.   As stated, there may be other genes associated with cancer risk for which Yessi was not tested.  Yessi was encouraged to contact the genetics program at [phone] with any questions or concerns and/or to periodically review test options and related insurance coverage.

## 2023-09-22 NOTE — LETTER
09/22/23    Emili Dickens MD  [address] [address]    Re:  Patient Preferred Name: Yessi Sharpe  Patient Legal Name: Yessi Sharpe    Dear Dr. Dickens,    I am writing to confirm that your patient, Yessi Sharpe, received care through my office on 09/22/23. I have enclosed a summary of the care provided to Yessi for your reference.    Please contact me with any questions you may have regarding the visit.    Sincerely,           Farrah Adam, [facility]      CC: No Recipients

## 2023-10-05 ENCOUNTER — APPOINTMENT (RX ONLY)
Dept: URBAN - METROPOLITAN AREA CLINIC 374 | Facility: CLINIC | Age: 23
Setting detail: DERMATOLOGY
End: 2023-10-05

## 2023-10-05 DIAGNOSIS — D18.0 HEMANGIOMA: ICD-10-CM

## 2023-10-05 DIAGNOSIS — D22 MELANOCYTIC NEVI: ICD-10-CM

## 2023-10-05 DIAGNOSIS — L73.8 OTHER SPECIFIED FOLLICULAR DISORDERS: ICD-10-CM

## 2023-10-05 DIAGNOSIS — L81.4 OTHER MELANIN HYPERPIGMENTATION: ICD-10-CM

## 2023-10-05 DIAGNOSIS — L82.1 OTHER SEBORRHEIC KERATOSIS: ICD-10-CM

## 2023-10-05 DIAGNOSIS — Z80.8 FAMILY HISTORY OF MALIGNANT NEOPLASM OF OTHER ORGANS OR SYSTEMS: ICD-10-CM

## 2023-10-05 PROBLEM — D22.5 MELANOCYTIC NEVI OF TRUNK: Status: ACTIVE | Noted: 2023-10-05

## 2023-10-05 PROBLEM — D18.01 HEMANGIOMA OF SKIN AND SUBCUTANEOUS TISSUE: Status: ACTIVE | Noted: 2023-10-05

## 2023-10-05 PROCEDURE — ? COUNSELING

## 2023-10-05 PROCEDURE — 99213 OFFICE O/P EST LOW 20 MIN: CPT

## 2023-10-05 PROCEDURE — ? FULL BODY SKIN EXAM

## 2023-10-05 PROCEDURE — ? SUNSCREEN RECOMMENDATIONS

## 2023-10-05 ASSESSMENT — LOCATION DETAILED DESCRIPTION DERM
LOCATION DETAILED: LEFT INFERIOR MEDIAL MALAR CHEEK
LOCATION DETAILED: INFERIOR THORACIC SPINE
LOCATION DETAILED: RIGHT RADIAL DORSAL HAND
LOCATION DETAILED: LEFT RADIAL DORSAL HAND
LOCATION DETAILED: LEFT LOWER CUTANEOUS LIP
LOCATION DETAILED: SUPERIOR THORACIC SPINE
LOCATION DETAILED: RIGHT MEDIAL UPPER BACK

## 2023-10-05 ASSESSMENT — LOCATION SIMPLE DESCRIPTION DERM
LOCATION SIMPLE: LEFT CHEEK
LOCATION SIMPLE: UPPER BACK
LOCATION SIMPLE: LEFT HAND
LOCATION SIMPLE: RIGHT UPPER BACK
LOCATION SIMPLE: RIGHT HAND
LOCATION SIMPLE: LEFT LIP

## 2023-10-05 ASSESSMENT — LOCATION ZONE DERM
LOCATION ZONE: TRUNK
LOCATION ZONE: HAND
LOCATION ZONE: LIP
LOCATION ZONE: FACE

## 2023-11-19 DIAGNOSIS — F41.9 ANXIETY: ICD-10-CM

## 2023-11-20 RX ORDER — VENLAFAXINE HYDROCHLORIDE 150 MG/1
CAPSULE, EXTENDED RELEASE ORAL DAILY
Qty: 90 CAPSULE | Refills: 3 | Status: SHIPPED | OUTPATIENT
Start: 2023-11-20 | End: 2024-07-12 | Stop reason: SDUPTHER

## 2024-03-22 ENCOUNTER — TELEPHONE (OUTPATIENT)
Dept: INTERNAL MEDICINE | Facility: CLINIC | Age: 24
End: 2024-03-22

## 2024-03-22 NOTE — TELEPHONE ENCOUNTER
[facility] Appointment Request   Provider: Emili Dickens  Appointment Type: Office visit  Reason for Visit: working on workplace accommodations letter, needs to have PCP sign it.  Available Day and Time: Any  Best Contact Number: [phone]    The practice will reach out to schedule your appointment within the next 2 business days.

## 2024-03-25 ENCOUNTER — OFFICE VISIT (OUTPATIENT)
Dept: INTERNAL MEDICINE | Facility: CLINIC | Age: 24
End: 2024-03-25
Payer: COMMERCIAL

## 2024-03-25 VITALS
WEIGHT: 205 LBS | RESPIRATION RATE: 15 BRPM | SYSTOLIC BLOOD PRESSURE: 120 MMHG | HEART RATE: 78 BPM | TEMPERATURE: 98.2 F | OXYGEN SATURATION: 99 % | BODY MASS INDEX: 31.07 KG/M2 | HEIGHT: 68 IN | DIASTOLIC BLOOD PRESSURE: 74 MMHG

## 2024-03-25 DIAGNOSIS — D50.9 IRON DEFICIENCY ANEMIA, UNSPECIFIED IRON DEFICIENCY ANEMIA TYPE: ICD-10-CM

## 2024-03-25 DIAGNOSIS — F32.A DEPRESSION, UNSPECIFIED DEPRESSION TYPE: ICD-10-CM

## 2024-03-25 DIAGNOSIS — F41.9 ANXIETY: Primary | ICD-10-CM

## 2024-03-25 PROCEDURE — 3008F BODY MASS INDEX DOCD: CPT | Performed by: STUDENT IN AN ORGANIZED HEALTH CARE EDUCATION/TRAINING PROGRAM

## 2024-03-25 PROCEDURE — 99214 OFFICE O/P EST MOD 30 MIN: CPT | Performed by: STUDENT IN AN ORGANIZED HEALTH CARE EDUCATION/TRAINING PROGRAM

## 2024-03-25 RX ORDER — HYDROXYZINE PAMOATE 50 MG/1
50 CAPSULE ORAL 3 TIMES DAILY PRN
Qty: 30 CAPSULE | Refills: 0 | Status: SHIPPED | OUTPATIENT
Start: 2024-03-25 | End: 2024-04-04

## 2024-03-25 NOTE — ASSESSMENT & PLAN NOTE
Lab Results   Component Value Date    WBC 5.6 06/02/2023    HGB 9.6 (L) 06/02/2023    HCT 31.5 (L) 06/02/2023    MCV 77 (L) 06/02/2023     06/02/2023     Scripts reprinted for recheck of CBC as well as iron studies  Pt has been working on increasing iron in diet since last labs

## 2024-03-25 NOTE — PROGRESS NOTES
[facility] in [place]  Emili Dickens MD      Phone: [phone]  Fax: [phone]           Patient ID: Yessi Sharpe                              : 2000    Visit Date: 3/25/2024    Chief Complaint: needs form completed for work      HPI      Patient ID: Yessi Sharpe is a 24 y.o. female who presents for form completion    Here for FMLA forms  Anxiety/depression - flares intermittently and has to take a day off  Following with therapist q2 weeks  Venlafaxine  mg daily     has c-scope scheduled with  digestive health  Saw genetic counselor who did testing for common cancer panel and it was negative/normal    The following have been reviewed and updated as appropriate in this visit:    Current Outpatient Medications:     hydrOXYzine (VistariL) 50 mg capsule, Take 1 capsule (50 mg total) by mouth 3 (three) times a day as needed for anxiety for up to 10 days., Disp: 30 capsule, Rfl: 0    venlafaxine XR (EFFEXOR-XR) 150 mg 24 hr capsule, TAKE 1 CAPSULE BY MOUTH EVERY DAY, Disp: 90 capsule, Rfl: 3    No Known Allergies      Health Maintenance   Topic Date Due    Cervical Cancer Screening  Never done    Influenza Vaccine (1) 2023    COVID-19 Vaccine (3 -  season) 2023    Depression Screening  2024    DTaP, Tdap, and Td Vaccines (8 - Td or Tdap) 2031    Zoster Vaccine (1 of 2) 2048    Meningococcal ACWY  Completed    HIB Vaccines  Completed    Hepatitis B Vaccines  Completed    IPV Vaccines  Completed    HPV Vaccines  Completed    HIV Screening  Completed    Hepatitis C Screening  Completed    RSV <20 months  Aged Out    Pneumococcal  Aged Out       Other screenings not listed above:    Review of Systems  Rest of ROS as above    Objective:    Vitals:    24 1104   BP: 120/74   BP Location: Left upper arm   Patient Position: Sitting   Pulse: 78   Resp: 15   Temp: 36.8 °C (98.2 °F)   TempSrc: Oral   SpO2: 99%  "  Weight: 93 kg (205 lb)   Height: 1.727 m (5' 8\")       Body mass index is 31.17 kg/m².  Wt Readings from Last 3 Encounters:   03/25/24 93 kg (205 lb)   05/25/23 85.7 kg (189 lb)          Physical Exam  Vitals reviewed.   Constitutional:       General: She is not in acute distress.  HENT:      Head: Normocephalic and atraumatic.   Eyes:      Conjunctiva/sclera: Conjunctivae normal.   Cardiovascular:      Rate and Rhythm: Normal rate.   Pulmonary:      Effort: Pulmonary effort is normal.   Musculoskeletal:         General: No deformity.   Skin:     General: Skin is warm and dry.   Neurological:      General: No focal deficit present.      Mental Status: She is alert.   Psychiatric:         Mood and Affect: Mood normal.         Behavior: Behavior normal.         Assessment and plan    Diagnoses and all orders for this visit:    Anxiety (Primary)  Assessment & Plan:  Stable on effexor for years  Continue current dose  Continue with therapy    FMLA forms completed for intermittent leave    Orders:  -     hydrOXYzine (VistariL) 50 mg capsule; Take 1 capsule (50 mg total) by mouth 3 (three) times a day as needed for anxiety for up to 10 days.    Depression, unspecified depression type  Assessment & Plan:  Stable on effexor for years  Continue current dose  Continue with therapy     FMLA forms completed for intermittent leave      Iron deficiency anemia, unspecified iron deficiency anemia type  Assessment & Plan:  Lab Results   Component Value Date    WBC 5.6 06/02/2023    HGB 9.6 (L) 06/02/2023    HCT 31.5 (L) 06/02/2023    MCV 77 (L) 06/02/2023     06/02/2023     Scripts reprinted for recheck of CBC as well as iron studies  Pt has been working on increasing iron in diet since last labs          Return in about 6 months (around 9/25/2024) for annual physical.       3/25/2024    "

## 2024-03-25 NOTE — ASSESSMENT & PLAN NOTE
Stable on effexor for years  Continue current dose  Continue with therapy     FMLA forms completed for intermittent leave

## 2024-05-08 ENCOUNTER — TELEPHONE (OUTPATIENT)
Dept: INTERNAL MEDICINE | Facility: CLINIC | Age: 24
End: 2024-05-08
Payer: COMMERCIAL

## 2024-05-08 DIAGNOSIS — R63.5 WEIGHT GAIN: Primary | ICD-10-CM

## 2024-05-08 NOTE — TELEPHONE ENCOUNTER
Patient is getting blood work done prior to appointment and she would like to have her thyroid checked also, having weight gain and sluggish. Can blood work be added so she can get done all at same time?

## 2024-05-08 NOTE — TELEPHONE ENCOUNTER
Please let patient know her office visit on 5/28/24 was cancelled by provider. Please call patient so she can reschedule.

## 2024-05-25 LAB
BASOPHILS # BLD AUTO: 0.1 X10E3/UL (ref 0–0.2)
BASOPHILS NFR BLD AUTO: 1 %
EOSINOPHIL # BLD AUTO: 0.1 X10E3/UL (ref 0–0.4)
EOSINOPHIL NFR BLD AUTO: 2 %
ERYTHROCYTE [DISTWIDTH] IN BLOOD BY AUTOMATED COUNT: 15.4 % (ref 11.7–15.4)
FERRITIN SERPL-MCNC: 4 NG/ML (ref 15–150)
HCT VFR BLD AUTO: 32.8 % (ref 34–46.6)
HGB BLD-MCNC: 10 G/DL (ref 11.1–15.9)
IMM GRANULOCYTES # BLD AUTO: 0 X10E3/UL (ref 0–0.1)
IMM GRANULOCYTES NFR BLD AUTO: 0 %
IRON SATN MFR SERPL: 11 % (ref 15–55)
IRON SERPL-MCNC: 37 UG/DL (ref 27–159)
LYMPHOCYTES # BLD AUTO: 1.9 X10E3/UL (ref 0.7–3.1)
LYMPHOCYTES NFR BLD AUTO: 34 %
MCH RBC QN AUTO: 23.5 PG (ref 26.6–33)
MCHC RBC AUTO-ENTMCNC: 30.5 G/DL (ref 31.5–35.7)
MCV RBC AUTO: 77 FL (ref 79–97)
MONOCYTES # BLD AUTO: 0.4 X10E3/UL (ref 0.1–0.9)
MONOCYTES NFR BLD AUTO: 6 %
NEUTROPHILS # BLD AUTO: 3.2 X10E3/UL (ref 1.4–7)
NEUTROPHILS NFR BLD AUTO: 57 %
PLATELET # BLD AUTO: 319 X10E3/UL (ref 150–450)
RBC # BLD AUTO: 4.25 X10E6/UL (ref 3.77–5.28)
T4 FREE SERPL-MCNC: 0.82 NG/DL (ref 0.82–1.77)
TIBC SERPL-MCNC: 338 UG/DL (ref 250–450)
TSH SERPL DL<=0.005 MIU/L-ACNC: 1.2 UIU/ML (ref 0.45–4.5)
UIBC SERPL-MCNC: 301 UG/DL (ref 131–425)
WBC # BLD AUTO: 5.7 X10E3/UL (ref 3.4–10.8)

## 2024-05-28 NOTE — RESULT ENCOUNTER NOTE
Josiah Dickson,  Your lab work shows you are still have a mild iron deficiency anemia. I recommend you take an over the counter iron supplement 325mg every other day. Please call and schedule a follow up appointment. Let us know if you have any questions.

## 2024-06-26 ENCOUNTER — TELEPHONE (OUTPATIENT)
Dept: INTERNAL MEDICINE | Facility: CLINIC | Age: 24
End: 2024-06-26

## 2024-06-26 NOTE — TELEPHONE ENCOUNTER
Request for Medical Advice (NON-URGENT)   Patient PCP: Emili Dickens MD  New or Existing Issue: NA  Question or Concern: Pt would like to know if she can discuss about increasing the dosage for venlafaxine XR (EFFEXOR-XR) over the phone without being seen. If she needs to have an appt, she would like to know if she can be seen before her next EPP scheduled on 9/19/2024.     She would like a call back at  [phone]     Preferred Pharmacy:   [facility]/pharmacy #[id number] - [place], [place] - [address] AT [facility]. [facility] [address]  [address]  [place] [address]  Phone: [phone] Fax: [phone]      The practice will reach out to discuss your Medical Question or Concern within 2 business days.

## 2024-07-02 ENCOUNTER — TELEMEDICINE (OUTPATIENT)
Dept: INTERNAL MEDICINE | Facility: CLINIC | Age: 24
End: 2024-07-02
Payer: COMMERCIAL

## 2024-07-02 DIAGNOSIS — F41.9 ANXIETY: Primary | ICD-10-CM

## 2024-07-02 DIAGNOSIS — F42.9 OBSESSIVE-COMPULSIVE DISORDER, UNSPECIFIED TYPE: ICD-10-CM

## 2024-07-02 DIAGNOSIS — F32.A DEPRESSION, UNSPECIFIED DEPRESSION TYPE: ICD-10-CM

## 2024-07-02 PROCEDURE — 99213 OFFICE O/P EST LOW 20 MIN: CPT | Mod: GT | Performed by: STUDENT IN AN ORGANIZED HEALTH CARE EDUCATION/TRAINING PROGRAM

## 2024-07-02 RX ORDER — VENLAFAXINE HYDROCHLORIDE 37.5 MG/1
37.5 CAPSULE, EXTENDED RELEASE ORAL DAILY
Qty: 90 CAPSULE | Refills: 0 | Status: SHIPPED | OUTPATIENT
Start: 2024-07-02 | End: 2024-08-15

## 2024-07-02 NOTE — PROGRESS NOTES
Verification of Patient Location:  The patient affirms they are currently located in the following state: [place]    Request for Consent:    Audio and Video Encounter   Logan, my name is Emili Dickens MD.  Before we proceed, can you please verify your identification by telling me your full name and date of birth?  Can you tell me who is in the room with you?    You and I are about to have a telemedicine check-in or visit because you have requested it.  This is a live video-conference.  I am a real person, speaking to you in real time.  There is no one else with me on the video-conference. I am not recording this conversation and I am asking you not to record it.  This telemedicine visit will be billed to your health insurance or you, if you are self-insured.  You understand you will be responsible for any copayments or coinsurances that apply to your telemedicine visit.  Communication platform used for this encounter:  [employer] Video Visit (Epic Video Client)       Before starting our telemedicine visit, I am required to get your consent for this virtual check-in or visit by telemedicine. Do you consent?    Patient Response to Request for Consent:  Yes      Visit Documentation:  Subjective     Patient ID: Yessi Sharpe is a 24 y.o. female.  2000      HPI    Follow up of anxiety  Working at [employer] currently and was discussing with psychiatrist  Struggling more with anxiety, OCD and depression  Taking effexor  mg daily  Feels like she wants to try increase in dose  Following with therapist    Awful side effects with cymbalta in the past    ABIGAIL-7  Feeling nervous, anxious or on edge: 2-->More than half the days    Not being able to stop or control worryin-->More than half the days    Worrying too much about different things: 2-->More than half the days    Trouble relaxin-->Several days    Being so restless that it is hard to sit still: 1-->Several days    Becoming easily annoyed or  irritable: 1-->Several days    Feeling afraid as if something awful might happen: 3-->Nearly every day      GAD7 Total Score: : 12      If you checked off any problems, how difficult have these made it for you to do your work, take care of things at home, or get along with other people?: Somewhat difficult            The following have been reviewed and updated as appropriate in this visit:   Allergies  Problems  Med Hx       Review of Systems  See HPI    Exam: alert, NAD, normal respiratory effort, speaking in full unlabored sentences    Assessment/Plan   Diagnoses and all orders for this visit:    Anxiety (Primary)  Depression, unspecified depression type  Obsessive-compulsive disorder, unspecified type  Anxiety, depression, OCD not optimally controlled at present  ABIGAIL-7 of 12 today  Will trial increase of venlafaxine XR to 187.5 mg daily  If tolerating, but still not well controlled in ~4 -6 weeks, pt can reach out for final dose increase to 225 mg daily  Continue with therapy    Other orders  -     venlafaxine XR (EFFEXOR XR) 37.5 mg 24 hr capsule; Take 1 capsule (37.5 mg total) by mouth daily.        Time Spent:  I spent 15 minutes on this date of service performing the following activities: obtaining history, performing examination, entering orders, documenting, preparing for visit, obtaining / reviewing records, and providing counseling and education.

## 2024-07-12 ENCOUNTER — OFFICE VISIT (OUTPATIENT)
Dept: INTERNAL MEDICINE | Facility: CLINIC | Age: 24
End: 2024-07-12
Payer: COMMERCIAL

## 2024-07-12 VITALS
OXYGEN SATURATION: 99 % | BODY MASS INDEX: 30.31 KG/M2 | HEIGHT: 68 IN | HEART RATE: 98 BPM | TEMPERATURE: 98 F | WEIGHT: 200 LBS | RESPIRATION RATE: 14 BRPM | SYSTOLIC BLOOD PRESSURE: 118 MMHG | DIASTOLIC BLOOD PRESSURE: 60 MMHG

## 2024-07-12 DIAGNOSIS — D50.9 IRON DEFICIENCY ANEMIA, UNSPECIFIED IRON DEFICIENCY ANEMIA TYPE: ICD-10-CM

## 2024-07-12 DIAGNOSIS — Z00.00 ANNUAL PHYSICAL EXAM: Primary | ICD-10-CM

## 2024-07-12 DIAGNOSIS — K59.09 CHRONIC CONSTIPATION: ICD-10-CM

## 2024-07-12 DIAGNOSIS — F41.9 ANXIETY: ICD-10-CM

## 2024-07-12 DIAGNOSIS — Z13.1 DIABETES MELLITUS SCREENING: ICD-10-CM

## 2024-07-12 DIAGNOSIS — E78.00 PURE HYPERCHOLESTEROLEMIA: ICD-10-CM

## 2024-07-12 PROCEDURE — 3008F BODY MASS INDEX DOCD: CPT | Performed by: STUDENT IN AN ORGANIZED HEALTH CARE EDUCATION/TRAINING PROGRAM

## 2024-07-12 PROCEDURE — 99213 OFFICE O/P EST LOW 20 MIN: CPT | Mod: 25 | Performed by: STUDENT IN AN ORGANIZED HEALTH CARE EDUCATION/TRAINING PROGRAM

## 2024-07-12 PROCEDURE — 99395 PREV VISIT EST AGE 18-39: CPT | Performed by: STUDENT IN AN ORGANIZED HEALTH CARE EDUCATION/TRAINING PROGRAM

## 2024-07-12 RX ORDER — VENLAFAXINE HYDROCHLORIDE 150 MG/1
150 CAPSULE, EXTENDED RELEASE ORAL DAILY
Qty: 90 CAPSULE | Refills: 3 | Status: SHIPPED | OUTPATIENT
Start: 2024-07-12

## 2024-07-12 SDOH — ECONOMIC STABILITY: FOOD INSECURITY: WITHIN THE PAST 12 MONTHS, YOU WORRIED THAT YOUR FOOD WOULD RUN OUT BEFORE YOU GOT MONEY TO BUY MORE.: SOMETIMES TRUE

## 2024-07-12 SDOH — ECONOMIC STABILITY: INCOME INSECURITY: IN THE LAST 12 MONTHS, WAS THERE A TIME WHEN YOU WERE NOT ABLE TO PAY THE MORTGAGE OR RENT ON TIME?: NO

## 2024-07-12 SDOH — ECONOMIC STABILITY: FOOD INSECURITY: WITHIN THE PAST 12 MONTHS, THE FOOD YOU BOUGHT JUST DIDN'T LAST AND YOU DIDN'T HAVE MONEY TO GET MORE.: SOMETIMES TRUE

## 2024-07-12 SDOH — ECONOMIC STABILITY: TRANSPORTATION INSECURITY
IN THE PAST 12 MONTHS, HAS LACK OF TRANSPORTATION KEPT YOU FROM MEETINGS, WORK, OR FROM GETTING THINGS NEEDED FOR DAILY LIVING?: NO

## 2024-07-12 SDOH — ECONOMIC STABILITY: TRANSPORTATION INSECURITY
IN THE PAST 12 MONTHS, HAS THE LACK OF TRANSPORTATION KEPT YOU FROM MEDICAL APPOINTMENTS OR FROM GETTING MEDICATIONS?: NO

## 2024-07-12 ASSESSMENT — SOCIAL DETERMINANTS OF HEALTH (SDOH): IN THE PAST 12 MONTHS, HAS THE ELECTRIC, GAS, OIL, OR WATER COMPANY THREATENED TO SHUT OFF SERVICE IN YOUR HOME?: NO

## 2024-07-12 ASSESSMENT — PATIENT HEALTH QUESTIONNAIRE - PHQ9: SUM OF ALL RESPONSES TO PHQ9 QUESTIONS 1 & 2: 0

## 2024-07-12 NOTE — ASSESSMENT & PLAN NOTE
Venlafaxine increased to 187.5 mg daily at recent telemed visit 7/2  Continue with current dose  If tolerating, but still not well controlled in ~4 -6 weeks, pt can reach out for final dose increase to 225 mg daily

## 2024-07-12 NOTE — PROGRESS NOTES
[facility] [place]  Emili Dickens MD      Phone: [phone]  Fax: [phone]           Patient ID: Yessi Sharpe                              : 2000    Visit Date: 2024    Chief Complaint: Annual Exam      HPI      Patient ID: Yessi Sharpe is a 24 y.o. female who presents for annual exam    Had c-scope scheduled for chronic constipation and fam hx of colon cancer (mother), but had to cancel; needs to reschedule. Following with  [facility].    Taking iron supplement intermittently - a couple times a week    Diet: working on increasing fiber, lots of fruits and veg, some junk food/take out  Activity: about 4k steps/day, going to join the [employer]  Dental: due  Cancer screenings  Due for pap - scheduled 24    The following have been reviewed and updated as appropriate in this visit:    Current Outpatient Medications:     venlafaxine XR (EFFEXOR XR) 37.5 mg 24 hr capsule, Take 1 capsule (37.5 mg total) by mouth daily., Disp: 90 capsule, Rfl: 0    venlafaxine XR (EFFEXOR-XR) 150 mg 24 hr capsule, Take 1 capsule (150 mg total) by mouth daily., Disp: 90 capsule, Rfl: 3    No Known Allergies      Health Maintenance   Topic Date Due    Cervical Cancer Screening  Never done    COVID-19 Vaccine (3 - - season) 2025 (Originally 2023)    Influenza Vaccine (1) 2024    Depression Screening  2025    DTaP, Tdap, and Td Vaccines (8 - Td or Tdap) 2031    Zoster Vaccine (1 of 2) 2048    Meningococcal ACWY  Completed    HIB Vaccines  Completed    Hepatitis B Vaccines  Completed    IPV Vaccines  Completed    HPV Vaccines  Completed    HIV Screening  Completed    Hepatitis C Screening  Completed    RSV <20 months  Aged Out    Pneumococcal  Aged Out       Other screenings not listed above:    Review of Systems  Rest of ROS as above    Objective:    Vitals:    24 1327   BP: 118/60   Pulse: 98   Resp: 14   Temp: 36.7 °C  "(98 °F)   SpO2: 99%   Weight: 90.7 kg (200 lb)   Height: 1.727 m (5' 7.99\")       Body mass index is 30.42 kg/m².  Wt Readings from Last 3 Encounters:   07/12/24 90.7 kg (200 lb)   03/25/24 93 kg (205 lb)   05/25/23 85.7 kg (189 lb)          Physical Exam  Vitals reviewed.   Constitutional:       Appearance: Normal appearance.   HENT:      Head: Normocephalic and atraumatic.      Right Ear: Tympanic membrane, ear canal and external ear normal.      Left Ear: Tympanic membrane, ear canal and external ear normal.      Nose: Nose normal.      Mouth/Throat:      Mouth: Mucous membranes are moist.      Pharynx: Oropharynx is clear.   Eyes:      Conjunctiva/sclera: Conjunctivae normal.   Cardiovascular:      Rate and Rhythm: Normal rate and regular rhythm.      Pulses: Normal pulses.      Heart sounds: Normal heart sounds.   Pulmonary:      Effort: Pulmonary effort is normal.      Breath sounds: Normal breath sounds.   Abdominal:      General: Bowel sounds are normal.      Palpations: Abdomen is soft.      Tenderness: There is no abdominal tenderness.   Musculoskeletal:         General: No deformity.      Cervical back: Neck supple.   Lymphadenopathy:      Cervical: No cervical adenopathy.   Skin:     General: Skin is warm and dry.   Neurological:      General: No focal deficit present.      Mental Status: She is alert.   Psychiatric:         Mood and Affect: Mood normal.         Behavior: Behavior normal.           Assessment and plan    Diagnoses and all orders for this visit:    Annual physical exam (Primary)  Exam within normal limits  Recommend:   - high fiber diet that is rich in fruits, vegetables, whole grains, legumes, nuts and seeds, and low in processed foods, refined grains, added sugars, and trans-fats  - regular exercise with goal of >150 min moderate intensity exercise per week  - routine dental cleanings q6 mo  Cancer screenings: pap due - scheduled with ob/gyn  Immunizations: UTD    Anxiety  Assessment & " Plan:  Venlafaxine increased to 187.5 mg daily at recent telemed visit 7/2  Continue with current dose  If tolerating, but still not well controlled in ~4 -6 weeks, pt can reach out for final dose increase to 225 mg daily    Orders:  -     venlafaxine XR (EFFEXOR-XR) 150 mg 24 hr capsule; Take 1 capsule (150 mg total) by mouth daily.  -     Basic metabolic panel; Future    Iron deficiency anemia, unspecified iron deficiency anemia type  Assessment & Plan:  Lab Results   Component Value Date    WBC 5.7 05/24/2024    HGB 10.0 (L) 05/24/2024    HCT 32.8 (L) 05/24/2024    MCV 77 (L) 05/24/2024     05/24/2024     Ferritin 4 on last labs 5/2024  Continue with iron supplement  Recheck levels    Orders:  -     Ferritin; Future  -     CBC and differential; Future    Diabetes mellitus screening  -     Hemoglobin A1c; Future  -     Basic metabolic panel; Future    Pure hypercholesterolemia  Assessment & Plan:  Lab Results   Component Value Date    CHOL 193 06/02/2023     Lab Results   Component Value Date    HDL 45 06/02/2023     Lab Results   Component Value Date    LDLCALC 131 (H) 06/02/2023     Lab Results   Component Value Date    TRIG 91 06/02/2023     Continue to work on healthful diet and regular physical activity      Orders:  -     Lipid panel; Future    Chronic constipation  Assessment & Plan:  Continue to work at high fiber diet, plenty of fluids  Following with [facility] - c-scope planned, pt to schedule          Return in about 1 year (around 7/12/2025) for annual physical.       7/12/2024

## 2024-07-12 NOTE — ASSESSMENT & PLAN NOTE
Continue to work at high fiber diet, plenty of fluids  Following with  digestive health - c-scope planned, pt to schedule

## 2024-07-12 NOTE — ASSESSMENT & PLAN NOTE
Lab Results   Component Value Date    WBC 5.7 05/24/2024    HGB 10.0 (L) 05/24/2024    HCT 32.8 (L) 05/24/2024    MCV 77 (L) 05/24/2024     05/24/2024     Ferritin 4 on last labs 5/2024  Continue with iron supplement  Recheck levels

## 2024-07-12 NOTE — ASSESSMENT & PLAN NOTE
Lab Results   Component Value Date    CHOL 193 06/02/2023     Lab Results   Component Value Date    HDL 45 06/02/2023     Lab Results   Component Value Date    LDLCALC 131 (H) 06/02/2023     Lab Results   Component Value Date    TRIG 91 06/02/2023     Continue to work on healthful diet and regular physical activity

## 2024-07-12 NOTE — PATIENT INSTRUCTIONS
Complete labs when you are able. Fast from anything with calories for 8-12 hours before getting blood work drawn, but drink plenty of water before going.    ____________________________    A healthy diet is rich in fruits, vegetables, whole grains, legumes, nuts and seeds. It is low in processed foods, refined grains, added sugars, and trans-fats.    Healthy lifestyle includes exercise 30 minutes/day, most days of the week.    Dental evaluation is recommended every 6 months.    Test smoke detectors and replace batteries often.    Wear sunscreen when exposed to the sun.    Always buckle your seatbelt.    Avoid tobacco products and limit alcohol intake.    Let me know of any health concerns or emergency room visits.

## 2024-08-07 ENCOUNTER — TELEPHONE (OUTPATIENT)
Dept: INTERNAL MEDICINE | Facility: CLINIC | Age: 24
End: 2024-08-07
Payer: COMMERCIAL

## 2024-08-07 NOTE — TELEPHONE ENCOUNTER
[facility]     Pharmacy is holding patient prescription because they need Dr. Dickens signature on a form from [facility]. Patient will stop at the office to drop forms.

## 2024-10-18 ENCOUNTER — TELEPHONE (OUTPATIENT)
Dept: INTERNAL MEDICINE | Facility: CLINIC | Age: 24
End: 2024-10-18

## 2024-10-18 NOTE — TELEPHONE ENCOUNTER
[facility] Appointment Request   Provider: Emili Dickens MD  Appointment Type: MyChart video visit  Reason for Visit: Pt noticed bloody discharge today 10/18/24  Available Day and Time: wednesday before 3:00 PM or any day after 3:30 PM  Best Contact Number: [phone]    The practice will reach out to schedule your appointment within the next 2 business days.

## 2024-12-03 NOTE — PROCEDURE: REASSURANCE
Orders:    Comprehensive Metabolic Panel; Future    Lipid Panel; Future    
Hide Additional Notes?: No
Detail Level: Zone

## 2025-05-16 LAB
BASOPHILS # BLD AUTO: 0.1 X10E3/UL (ref 0–0.2)
BASOPHILS NFR BLD AUTO: 1 %
BUN SERPL-MCNC: 9 MG/DL (ref 6–20)
BUN/CREAT SERPL: 15 (ref 9–23)
CALCIUM SERPL-MCNC: 9.5 MG/DL (ref 8.7–10.2)
CHLORIDE SERPL-SCNC: 105 MMOL/L (ref 96–106)
CO2 SERPL-SCNC: 23 MMOL/L (ref 20–29)
CREAT SERPL-MCNC: 0.6 MG/DL (ref 0.57–1)
EGFRCR SERPLBLD CKD-EPI 2021: 126 ML/MIN/1.73
EOSINOPHIL # BLD AUTO: 0.4 X10E3/UL (ref 0–0.4)
EOSINOPHIL NFR BLD AUTO: 6 %
ERYTHROCYTE [DISTWIDTH] IN BLOOD BY AUTOMATED COUNT: 13.9 % (ref 11.7–15.4)
GLUCOSE SERPL-MCNC: 91 MG/DL (ref 70–99)
HBA1C MFR BLD: 5.5 % (ref 4.8–5.6)
HCT VFR BLD AUTO: 35.5 % (ref 34–46.6)
HGB BLD-MCNC: 11 G/DL (ref 11.1–15.9)
IMM GRANULOCYTES # BLD AUTO: 0 X10E3/UL (ref 0–0.1)
IMM GRANULOCYTES NFR BLD AUTO: 0 %
LYMPHOCYTES # BLD AUTO: 2.2 X10E3/UL (ref 0.7–3.1)
LYMPHOCYTES NFR BLD AUTO: 36 %
MCH RBC QN AUTO: 25 PG (ref 26.6–33)
MCHC RBC AUTO-ENTMCNC: 31 G/DL (ref 31.5–35.7)
MCV RBC AUTO: 81 FL (ref 79–97)
MONOCYTES # BLD AUTO: 0.3 X10E3/UL (ref 0.1–0.9)
MONOCYTES NFR BLD AUTO: 6 %
NEUTROPHILS # BLD AUTO: 3.2 X10E3/UL (ref 1.4–7)
NEUTROPHILS NFR BLD AUTO: 51 %
PLATELET # BLD AUTO: 331 X10E3/UL (ref 150–450)
POTASSIUM SERPL-SCNC: 4.6 MMOL/L (ref 3.5–5.2)
RBC # BLD AUTO: 4.4 X10E6/UL (ref 3.77–5.28)
SODIUM SERPL-SCNC: 140 MMOL/L (ref 134–144)
WBC # BLD AUTO: 6.2 X10E3/UL (ref 3.4–10.8)

## 2025-05-17 LAB
CHOLEST SERPL-MCNC: 184 MG/DL (ref 100–199)
FERRITIN SERPL-MCNC: 5 NG/ML (ref 15–150)
HDLC SERPL-MCNC: 46 MG/DL
LDLC SERPL CALC-MCNC: 125 MG/DL (ref 0–99)
TRIGL SERPL-MCNC: 68 MG/DL (ref 0–149)
VLDLC SERPL CALC-MCNC: 13 MG/DL (ref 5–40)